# Patient Record
(demographics unavailable — no encounter records)

---

## 2024-11-12 NOTE — HISTORY OF PRESENT ILLNESS
[Mother] : mother [Yes] : Patient goes to dentist yearly [Normal] : normal [LMP: _____] : LMP: [unfilled] [Cycle Length: _____ days] : Cycle Length: [unfilled] days [Days of Bleeding: _____] : Days of bleeding: [unfilled] [Age of Menarche: ____] : Age of Menarche: [unfilled] [Heavy Bleeding] : heavy bleeding [Painful Cramps] : painful cramps [Eats meals with family] : eats meals with family [Has family members/adults to turn to for help] : has family members/adults to turn to for help [Is permitted and is able to make independent decisions] : Is permitted and is able to make independent decisions [Grade: ____] : Grade: [unfilled] [Normal Performance] : normal performance [Normal Behavior/Attention] : normal behavior/attention [Normal Homework] : normal homework [Eats regular meals including adequate fruits and vegetables] : eats regular meals including adequate fruits and vegetables [Drinks non-sweetened liquids] : drinks non-sweetened liquids  [Calcium source] : calcium source [Has friends] : has friends [At least 1 hour of physical activity a day] : at least 1 hour of physical activity a day [Has interests/participates in community activities/volunteers] : has interests/participates in community activities/volunteers. [Uses safety belts/safety equipment] : uses safety belts/safety equipment  [Has peer relationships free of violence] : has peer relationships free of violence [No] : Patient has not had sexual intercourse. [HIV Screening Declined] : HIV Screening Declined [Has ways to cope with stress] : has ways to cope with stress [Displays self-confidence] : displays self-confidence [Irregular menses] : no irregular menses [Acne] : no acne [Tampon Use] : no tampon use [Sleep Concerns] : no sleep concerns [Has concerns about body or appearance] : does not have concerns about body or appearance [Screen time (except homework) less than 2 hours a day] : no screen time (except homework) less than 2 hours a day [Uses electronic nicotine delivery system] : does not use electronic nicotine delivery system [Exposure to electronic nicotine delivery system] : no exposure to electronic nicotine delivery system [Uses tobacco] : does not use tobacco [Exposure to tobacco] : no exposure to tobacco [Uses drugs] : does not use drugs  [Exposure to drugs] : no exposure to drugs [Drinks alcohol] : does not drink alcohol [Exposure to alcohol] : no exposure to alcohol [Impaired/distracted driving] : no impaired/distracted driving [Has problems with sleep] : does not have problems with sleep [Gets depressed, anxious, or irritable/has mood swings] : does not get depressed, anxious, or irritable/has mood swings [Has thought about hurting self or considered suicide] : has not thought about hurting self or considered suicide [FreeTextEntry8] : pads

## 2024-11-12 NOTE — RISK ASSESSMENT
[PHQ-2 Negative - No further assessment needed] : PHQ-2 Negative - No further assessment needed [PHQ-9 Negative - No further assessment needed] : PHQ-9 Negative - No further assessment needed [Yes] : Patient consents to screening.

## 2024-11-12 NOTE — DISCUSSION/SUMMARY
[] : The components of the vaccine(s) to be administered today are listed in the plan of care. The disease(s) for which the vaccine(s) are intended to prevent and the risks have been discussed with the caretaker.  The risks are also included in the appropriate vaccination information statements which have been provided to the patient's caregiver.  The caregiver has given consent to vaccinate. [Met privately with the adolescent for part of the office visit?] : Met privately with the adolescent for part of the office visit? Yes [Adolescent demonstrates understanding of his/her conditions and how to take prescribed medications?] : Adolescent demonstrates understanding of his/her conditions and how to take prescribed medications? Yes [Adolescent asks questions during each office  visit and participates in the care plan?] : Adolescent asks questions during each office visit and participates in the care plan? Yes [Adolescent is competent in independently making appointments, filling prescriptions, following up on referrals, and seeking emergency services, as needed?] : Adolescent is competent in independently making appointments, filling prescriptions, following up on referrals, and seeking emergency services, as needed? Yes [Adolescent's caregivers were provided with the opportunity to discuss their concerns about transferring decision making responsibility to the adolescent?] : Adolescent's caregivers were provided with the opportunity to discuss their concerns about transferring decision making responsibility to the adolescent? Yes [Initiated discussion about transfer to an adult healthcare provider?] : Initiated discussion about transfer to an adult healthcare provider? Yes  [Discussed choices for adult care and assist in identifying possible care providers?] : Discussed choices for adult care and assist in identifying possible care providers? Yes [Initiated communication with the adult provider that the family and adolescent has selected?] : Initiated communication with the adult provider that the family and adolescent has selected? Yes

## 2024-11-12 NOTE — PHYSICAL EXAM

## 2024-11-20 NOTE — HISTORY OF PRESENT ILLNESS
[FreeTextEntry1] : 19 year old G0 female presents to establish care and evaluate for PCOS, presenting with mom who has h/o PCOS. Referred by pediatrician. Menarche age 10 with menses every 30 days but also has hirsutism. H/o ARAMIS.  Reports heavy flow with having to change pads 4x per day. Notes she has missed school due to painful menstrual cramps.  PMHx: h/o ARAMIS PSHx: adenoidectomy  Allergies: Augmentin  SHx: Student at [employer]. Identifies as straight. Denies hx sexual activity

## 2024-11-20 NOTE — END OF VISIT
[FreeTextEntry3] : I, Suzanna David, acted as a scribe on behalf of Dr. Reny Flaherty M.D. on 11/12/2024.  All medical entries made by the scribe were at my, Dr. Reny Flaherty M.D., direction and personally dictated by me on 11/12/2024. I have reviewed the chart and agree that the record accurately reflects my personal performance of the history, physical exam, assessment and plan. I have also personally directed, reviewed, and agreed with the chart.

## 2024-11-20 NOTE — HISTORY OF PRESENT ILLNESS
[FreeTextEntry1] : 19 year old G0 female presents to establish care and evaluate for PCOS, presenting with mom who has h/o PCOS. Referred by pediatrician. Menarche age 10 with menses every 30 days but also has hirsutism. H/o ARAMIS.  Reports heavy flow with having to change pads 4x per day. Notes she has missed school due to painful menstrual cramps.  PMHx: h/o ARAMIS PSHx: adenoidectomy  Allergies: Augmentin  SHx: Student at [employer]. Identifies as straight. Denies hx sexual activity

## 2024-11-20 NOTE — PLAN
[FreeTextEntry1] : 19 year old female presents to establish care and to discuss PCOS, also with heavy menses and dysmenorrhea  -Pt does not meet criteria for adolescent PCOS I counseled the patient regarding treatment options for heavy menstrual bleeding including tranexamic acid and hormonal options, specifically combined oral contraceptive pills, patch and ring, all with option for extended cycle or continuous dosing, and levonorgestrel intrauterine device.  I reviewed the risks, potential side effects and benefits of each. I counseled the patient in detail about options for treatment of dysmenorrhea including NSAIDs, LNG-IUD and potential for irregular bleeding and amenorrhea.  I also counseled her about combined contraceptives including oral contraceptive pills, patch and ring, along with their potential side effects including VTE, bloating, mood changes, breast tenderness, nausea, headache, and breakthrough bleeding, emphasizing that these typically resolve after one full cycle of the medication and, if they do not, there are other similar options that may not cause the same side effects.  Patient must take pill same time daily for maximum efficacy and to avoid breakthrough bleeding and pain. -Pt will try Naproxen and TXA  F/u 2-3 months

## 2025-02-10 NOTE — END OF VISIT
[FreeTextEntry3] :  I, Myrtle Victor, acted as a scribe on behalf of Dr. Reny Flaherty M.D. on 02/10/2025.   All medical entries made by the scribe were at my, Dr. Reny Flaherty M.D., direction and personally dictated by me on 02/10/2025. I have reviewed the chart and agree that the record accurately reflects my personal performance of the history, physical exam, assessment and plan. I have also personally directed, reviewed, and agreed with the chart.

## 2025-02-10 NOTE — HISTORY OF PRESENT ILLNESS
[FreeTextEntry1] : 19 year old G0 female presents for f/u of heavy and painful menses. Was started on Naproxen and TXA at time of initial visit on 11/12. Had evidence for iron deficiency on bloodwork done in November. Reports bleeding is lighter and periods last 4 days instead of 7. States pain has improved, but still has painful cramps. Pt inquires if she can increase dose of Naproxen.

## 2025-02-10 NOTE — PLAN
[FreeTextEntry1] : 19 year old G0 female presents for f/u of heavy and painful menses.  -Advised to add Tylenol and not to take additional Naproxen -Refill meds -Consider hormonal treatment if sxs worsen again  F/u in November for annual.

## 2025-02-10 NOTE — REASON FOR VISIT
[Home] : at home, [unfilled] , at the time of the visit. [Medical Office: ([facility])___] : at the medical office located in  [Follow-Up] : a follow-up evaluation of [Telehealth (audio & video)] : This visit was provided via telehealth using real-time 2-way audio visual technology. [Verbal consent obtained from patient] : the patient, [unfilled]